# Patient Record
Sex: FEMALE | Race: WHITE | NOT HISPANIC OR LATINO | Employment: OTHER | URBAN - METROPOLITAN AREA
[De-identification: names, ages, dates, MRNs, and addresses within clinical notes are randomized per-mention and may not be internally consistent; named-entity substitution may affect disease eponyms.]

---

## 2022-10-01 ENCOUNTER — OFFICE VISIT (OUTPATIENT)
Dept: URGENT CARE | Facility: CLINIC | Age: 27
End: 2022-10-01
Payer: COMMERCIAL

## 2022-10-01 VITALS
SYSTOLIC BLOOD PRESSURE: 104 MMHG | RESPIRATION RATE: 12 BRPM | WEIGHT: 134 LBS | DIASTOLIC BLOOD PRESSURE: 68 MMHG | BODY MASS INDEX: 24.66 KG/M2 | HEIGHT: 62 IN | HEART RATE: 99 BPM | OXYGEN SATURATION: 100 % | TEMPERATURE: 97.5 F

## 2022-10-01 DIAGNOSIS — S05.01XA ABRASION OF RIGHT CONJUNCTIVA, INITIAL ENCOUNTER: ICD-10-CM

## 2022-10-01 PROCEDURE — 99203 OFFICE O/P NEW LOW 30 MIN: CPT | Performed by: PHYSICIAN ASSISTANT

## 2022-10-01 RX ORDER — ERYTHROMYCIN 5 MG/G
1 OINTMENT OPHTHALMIC 3 TIMES DAILY
Qty: 3.5 G | Refills: 0 | Status: SHIPPED | OUTPATIENT
Start: 2022-10-01 | End: 2022-10-06

## 2022-10-01 RX ORDER — GABAPENTIN 300 MG/1
300 CAPSULE ORAL 3 TIMES DAILY
COMMUNITY

## 2022-10-01 ASSESSMENT — ENCOUNTER SYMPTOMS
SPEECH CHANGE: 0
FEVER: 0
PHOTOPHOBIA: 0
EYE ITCHING: 0
DOUBLE VISION: 0
FOREIGN BODY SENSATION: 1
EYE DISCHARGE: 0
WEAKNESS: 0
EYE REDNESS: 1
BLURRED VISION: 1
CHILLS: 0
EYE PAIN: 1
HEADACHES: 0
SENSORY CHANGE: 0
VOMITING: 0
TINGLING: 0
NAUSEA: 0

## 2022-10-02 NOTE — PROGRESS NOTES
"Rose Hanson is a 27 y.o. female who presents with Eye Problem (Pt has pain and discomfort on (R) eye, having trouble opening eye x today )            Eye Problem   The right eye is affected. This is a new problem. The current episode started today (patient was flippingna metal sign and caught the edge of the sign on her right eye. She has pain in her right eye and is having difficulty keeping it open). The problem occurs constantly. The problem has been unchanged. The injury mechanism was a direct trauma. The pain is moderate. She Does not wear contacts. Associated symptoms include blurred vision, eye redness and a foreign body sensation. Pertinent negatives include no eye discharge, double vision, fever, itching, nausea, photophobia, recent URI, tingling, vomiting or weakness. She has tried nothing for the symptoms.     History reviewed. No pertinent past medical history.    History reviewed. No pertinent surgical history.      History reviewed. No pertinent family history.      Patient has no known allergies.    Medications, Allergies, and current problem list reviewed today in Epic    Review of Systems   Constitutional:  Negative for chills, fever and malaise/fatigue.   Eyes:  Positive for blurred vision, pain (right eye) and redness. Negative for double vision, photophobia, discharge and itching.   Gastrointestinal:  Negative for nausea and vomiting.   Neurological:  Negative for tingling, sensory change, speech change, weakness and headaches.      All other systems reviewed and are negative.         Objective     /68 (BP Location: Right arm, Patient Position: Sitting, BP Cuff Size: Small adult)   Pulse 99   Temp 36.4 °C (97.5 °F) (Temporal)   Resp 12   Ht 1.575 m (5' 2\")   Wt 60.8 kg (134 lb)   SpO2 100%   BMI 24.51 kg/m²      VA- Left- 20/25  Right- unable to see due to discomfort   Both 20/30  Physical Exam  Constitutional:       General: She is not in acute distress.     " Appearance: Normal appearance. She is not ill-appearing.   HENT:      Head: Normocephalic and atraumatic.   Eyes:      General: Lids are normal.         Right eye: No foreign body or discharge.         Left eye: No foreign body or discharge.      Extraocular Movements: Extraocular movements intact.      Conjunctiva/sclera:      Right eye: Right conjunctiva is injected. No chemosis or exudate.     Left eye: Left conjunctiva is not injected. No exudate.     Pupils: Pupils are equal, round, and reactive to light.      Right eye: Corneal abrasion and fluorescein uptake present.     Cardiovascular:      Rate and Rhythm: Normal rate and regular rhythm.   Pulmonary:      Effort: Pulmonary effort is normal. No respiratory distress.      Breath sounds: No stridor. No wheezing.   Skin:     General: Skin is warm and dry.   Neurological:      General: No focal deficit present.      Mental Status: She is alert and oriented to person, place, and time.   Psychiatric:         Mood and Affect: Mood normal.         Behavior: Behavior normal.         Thought Content: Thought content normal.         Judgment: Judgment normal.                           Assessment & Plan        1. Abrasion of right conjunctiva, initial encounter  PAtient was able to see  and open her eyes comfortable after numbing drops were inserted   Corneal abrasion noted with Fluorescein uptake  - erythromycin 5 MG/GM Ointment; Apply 1 Application to right eye 3 times a day for 5 days.  Dispense: 3.5 g; Refill: 0        Differential diagnoses, Supportive care, and indications for immediate follow-up discussed with patient.   Pathogenesis of diagnosis discussed including typical length and natural progression.   Instructed to return to clinic or nearest emergency department for any change in condition, further concerns, or worsening of symptoms.      The patient demonstrated a good understanding and agreed with the treatment plan.    Abi Madrigal,  TERESA